# Patient Record
Sex: MALE | Race: WHITE | NOT HISPANIC OR LATINO | URBAN - METROPOLITAN AREA
[De-identification: names, ages, dates, MRNs, and addresses within clinical notes are randomized per-mention and may not be internally consistent; named-entity substitution may affect disease eponyms.]

---

## 2021-04-14 ENCOUNTER — OFFICE VISIT (OUTPATIENT)
Dept: URBAN - METROPOLITAN AREA CLINIC 12 | Facility: CLINIC | Age: 56
End: 2021-04-14
Payer: OTHER GOVERNMENT

## 2021-04-14 ENCOUNTER — DASHBOARD ENCOUNTERS (OUTPATIENT)
Age: 56
End: 2021-04-14

## 2021-04-14 VITALS
HEIGHT: 69 IN | DIASTOLIC BLOOD PRESSURE: 92 MMHG | HEART RATE: 75 BPM | SYSTOLIC BLOOD PRESSURE: 133 MMHG | TEMPERATURE: 98 F | WEIGHT: 203 LBS | BODY MASS INDEX: 30.07 KG/M2

## 2021-04-14 DIAGNOSIS — Z12.11 COLON CANCER SCREENING: ICD-10-CM

## 2021-04-14 DIAGNOSIS — K21.9 CHRONIC GERD: ICD-10-CM

## 2021-04-14 PROCEDURE — 99244 OFF/OP CNSLTJ NEW/EST MOD 40: CPT | Performed by: INTERNAL MEDICINE

## 2021-04-14 PROCEDURE — 99204 OFFICE O/P NEW MOD 45 MIN: CPT | Performed by: INTERNAL MEDICINE

## 2021-04-14 RX ORDER — LANSOPRAZOLE 30 MG/1
TAKE 1 CAPSULE BY ORAL ROUTE DAILY FOR 30 DAYS CAPSULE, DELAYED RELEASE ORAL 1
Qty: 30 | Refills: 11 | Status: ACTIVE | COMMUNITY
Start: 2017-09-11

## 2021-04-14 NOTE — HPI-TODAY'S VISIT:
56M ref by Dr Roly Frank for GI eval for chr GERD and colon CA screening.  A copy of this document will be sent to the referring physician. [id number] at [facility]. no BE Colon 2016 per pt--rec rpt in 2021 he says he still has GERD and still takes Prevacid 30 qd. not on ranitidine still has some breakthrough gerd symptoms no dysphagia. no abd pain. no N/V BM regular. no blood

## 2021-04-30 PROBLEM — 235595009 GASTROESOPHAGEAL REFLUX DISEASE: Status: ACTIVE | Noted: 2021-04-14

## 2021-05-06 ENCOUNTER — OFFICE VISIT (OUTPATIENT)
Dept: URBAN - METROPOLITAN AREA LAB 3 | Facility: LAB | Age: 56
End: 2021-05-06
Payer: OTHER GOVERNMENT

## 2021-05-06 DIAGNOSIS — Z12.11 COLON CANCER SCREENING: ICD-10-CM

## 2021-05-06 DIAGNOSIS — K31.7 BENIGN GASTRIC POLYP: ICD-10-CM

## 2021-05-06 DIAGNOSIS — D12.3 ADENOMA OF TRANSVERSE COLON: ICD-10-CM

## 2021-05-06 DIAGNOSIS — K22.8 COLUMNAR-LINED ESOPHAGUS: ICD-10-CM

## 2021-05-06 PROCEDURE — 45385 COLONOSCOPY W/LESION REMOVAL: CPT | Performed by: INTERNAL MEDICINE

## 2021-05-06 PROCEDURE — 43239 EGD BIOPSY SINGLE/MULTIPLE: CPT | Performed by: INTERNAL MEDICINE

## 2021-05-06 RX ORDER — LANSOPRAZOLE 30 MG/1
TAKE 1 CAPSULE BY ORAL ROUTE DAILY FOR 30 DAYS CAPSULE, DELAYED RELEASE ORAL 1
Qty: 30 | Refills: 11 | Status: ACTIVE | COMMUNITY
Start: 2017-09-11